# Patient Record
Sex: FEMALE
[De-identification: names, ages, dates, MRNs, and addresses within clinical notes are randomized per-mention and may not be internally consistent; named-entity substitution may affect disease eponyms.]

---

## 2019-07-20 ENCOUNTER — TRANSCRIPTION ENCOUNTER (OUTPATIENT)
Age: 49
End: 2019-07-20

## 2019-11-08 PROBLEM — Z00.00 ENCOUNTER FOR PREVENTIVE HEALTH EXAMINATION: Status: ACTIVE | Noted: 2019-11-08

## 2019-12-05 ENCOUNTER — APPOINTMENT (OUTPATIENT)
Dept: BREAST CENTER | Facility: CLINIC | Age: 49
End: 2019-12-05
Payer: SELF-PAY

## 2019-12-05 ENCOUNTER — LABORATORY RESULT (OUTPATIENT)
Age: 49
End: 2019-12-05

## 2019-12-05 VITALS
TEMPERATURE: 98.2 F | SYSTOLIC BLOOD PRESSURE: 117 MMHG | DIASTOLIC BLOOD PRESSURE: 72 MMHG | HEART RATE: 80 BPM | BODY MASS INDEX: 31.7 KG/M2 | WEIGHT: 214 LBS | HEIGHT: 69 IN

## 2019-12-05 DIAGNOSIS — N60.02 SOLITARY CYST OF RIGHT BREAST: ICD-10-CM

## 2019-12-05 DIAGNOSIS — N60.01 SOLITARY CYST OF RIGHT BREAST: ICD-10-CM

## 2019-12-05 DIAGNOSIS — Z78.9 OTHER SPECIFIED HEALTH STATUS: ICD-10-CM

## 2019-12-05 PROCEDURE — 99243 OFF/OP CNSLTJ NEW/EST LOW 30: CPT | Mod: 25

## 2019-12-05 PROCEDURE — 19000 PUNCTURE ASPIR CYST BREAST: CPT

## 2019-12-05 RX ORDER — CHLORHEXIDINE GLUCONATE 4 %
325 (65 FE) LIQUID (ML) TOPICAL
Refills: 0 | Status: ACTIVE | COMMUNITY

## 2019-12-05 RX ORDER — ERGOCALCIFEROL (VITAMIN D2) 1250 MCG
50000 CAPSULE ORAL
Refills: 0 | Status: ACTIVE | COMMUNITY

## 2019-12-05 RX ORDER — ACETAMINOPHEN 160 MG/5ML
500 SUSPENSION ORAL
Refills: 0 | Status: ACTIVE | COMMUNITY

## 2020-03-02 ENCOUNTER — APPOINTMENT (OUTPATIENT)
Dept: ULTRASOUND IMAGING | Facility: CLINIC | Age: 50
End: 2020-03-02
Payer: SELF-PAY

## 2020-03-02 PROCEDURE — 76641 ULTRASOUND BREAST COMPLETE: CPT | Mod: RT

## 2020-03-06 NOTE — PAST MEDICAL HISTORY
[Menarche Age ____] : age at menarche was [unfilled] [Definite ___ (Date)] : the last menstrual period was [unfilled] [Menstruating] : The patient is menstruating [Total Preg ___] : G[unfilled] [FreeTextEntry7] : yes, a few days [FreeTextEntry8] : NA

## 2020-03-06 NOTE — HISTORY OF PRESENT ILLNESS
[FreeTextEntry1] : Pt is a 48 y/o female, here for initial consult, referred by Dr. Yasmine Taveras Pac for tender right breast cyst for > 1 yr. Denies any breast lesions, discharge or masses to Left. Pain comes and goes but is uncomfortable to rest on R side. Does not require pain medication. Requesting drainage of cyst today.\par No FHx breast or other cancers.\par \par 7/25/19 Liliane Rodrigo DmmgT/US; baseline, dense, rodrigo benign calcs, multiple circumscribed masses rodrigo predominantly to R upper medial. BR2\par 7/25/19 Liliane Rodrigo US: multiple cysts rodrigo, R  largest cyst 12:00 N5 (4.5cm); L largest 12:00 (2.4cm). BR2

## 2020-03-06 NOTE — ADDENDUM
[FreeTextEntry1] : 12/5/19 FNA R 12:00 N5: Cytology. Neg for malignant cell. Compatible with inflamed cyst contents, FC changes.\par 3/2/20 Liliane R US: numerous cyst identified with largest one 11:00 (3cm), 12:00 (2cm) previously 4.5cm. BR2 Resume regular screening.

## 2020-03-06 NOTE — PROCEDURE
[___ ml Inj] : [unfilled] ~Uml [1%] : 1% [Alcohol] : using alcohol [FreeTextEntry1] : FNA right breast cyst [FreeTextEntry2] : Right breast cyst [FreeTextEntry3] : Procedure reviewed and consent signed. Area was prepped with alcohol. The area was injected with 3cc of 1% lidocaine. The Right breast cyst was lightly aspirated  using 18 gauge of 5cc of dark red serosangenous fluid. ? hemorrhagic cyst? pt denies trauma. Not fully resolved.  Patient tolerated the procedure well. Bandaid was applied. Specimen sent to cytology given bloody and not fully resolved. \par \par  [FreeTextEntry8] : Right breast 12:00 N5

## 2020-03-06 NOTE — REVIEW OF SYSTEMS
[Negative] : Psychiatric [Recent Weight Gain (___ Lbs)] : recent [unfilled] ~Ulb weight gain [Breast Lump] : breast lump [FreeTextEntry2] : reduction diet [de-identified] : Right breast 12:00

## 2020-03-06 NOTE — ASSESSMENT
[FreeTextEntry1] : Pt is a 50 y/o female, here for initial consult, referred by Dr. Yasmine Taveras Pac for tender right breast cyst for > 1 yr. Denies any breast lesions, discharge or masses to Left. Pain comes and goes but is uncomfortable to rest on R side. Does not require pain medication. Requesting drainage of cyst today.\par No FHx breast or other cancers.\par \par 7/25/19 Rodrigo DmmgT/US; baseline, dense, rodrigo benign calcs, multiple circumscribed masses rodrigo predominantly to R upper medial. BR2\par 7/25/19 Rodrigo US: multiple cysts rodrigo, R  largest cyst 12:00 N5 (4.5cm); L largest 12:00 (2.4cm). BR2\par CBE: FC changes with palp mass 12:00 R. FNA with 5 cc serosanguineous fluid. Recent mmg and US studies reviewed. \par The fluid was sent for cytology given not full resolution and fluid bloody.  If negative, would rec f.u u/s 3.\par Pt denied trauma to the area.

## 2020-03-06 NOTE — PHYSICAL EXAM
[Bra Size: ___] : Bra Size: [unfilled] [Atraumatic] : atraumatic [Normocephalic] : normocephalic [Supple] : supple [No Supraclavicular Adenopathy] : no supraclavicular adenopathy [No Thyromegaly] : no thyromegaly [No dominant masses] : no dominant masses in right breast  [Examined in the supine and seated position] : examined in the supine and seated position [No dominant masses] : no dominant masses left breast [No Nipple Retraction] : no left nipple retraction [No Nipple Discharge] : no left nipple discharge [No Axillary Lymphadenopathy] : no left axillary lymphadenopathy [No Rashes] : no rashes [No Edema] : no edema [No Ulceration] : no ulceration [de-identified] : FC changes [de-identified] : 12:00 N5 3cm palp mass corresponding to the largest cyst on u/s. tender.  [de-identified] : 2:00 12:00 2cm palp mass corresponding to cysts-confirmed on office u/s.

## 2020-03-06 NOTE — DATA REVIEWED
[FreeTextEntry1] : 7/25/19 Rodrigo DmmgT/US; baseline, dense, rodrigo benign calcs, multiple circumscribed masses rodrigo predominantly to R upper medial. BR2\par 7/25/19 Rodrigo US: multiple cysts rodrigo, R  largest cyst 12:00 N5 (4.5cm); L largest 12:00 (2.4cm). BR2

## 2020-04-02 ENCOUNTER — APPOINTMENT (OUTPATIENT)
Dept: BREAST CENTER | Facility: CLINIC | Age: 50
End: 2020-04-02

## 2021-02-10 ENCOUNTER — APPOINTMENT (OUTPATIENT)
Dept: ULTRASOUND IMAGING | Facility: CLINIC | Age: 51
End: 2021-02-10

## 2021-02-10 ENCOUNTER — APPOINTMENT (OUTPATIENT)
Dept: MAMMOGRAPHY | Facility: CLINIC | Age: 51
End: 2021-02-10

## 2021-03-01 ENCOUNTER — TRANSCRIPTION ENCOUNTER (OUTPATIENT)
Age: 51
End: 2021-03-01

## 2021-08-05 ENCOUNTER — APPOINTMENT (OUTPATIENT)
Dept: ULTRASOUND IMAGING | Facility: CLINIC | Age: 51
End: 2021-08-05

## 2021-08-05 ENCOUNTER — APPOINTMENT (OUTPATIENT)
Dept: MAMMOGRAPHY | Facility: CLINIC | Age: 51
End: 2021-08-05
Payer: COMMERCIAL

## 2021-08-05 PROCEDURE — 76536 US EXAM OF HEAD AND NECK: CPT

## 2021-08-05 PROCEDURE — 76641 ULTRASOUND BREAST COMPLETE: CPT | Mod: 50

## 2021-08-05 PROCEDURE — 77063 BREAST TOMOSYNTHESIS BI: CPT

## 2021-08-05 PROCEDURE — 77067 SCR MAMMO BI INCL CAD: CPT

## 2021-10-07 ENCOUNTER — APPOINTMENT (OUTPATIENT)
Dept: MAMMOGRAPHY | Facility: CLINIC | Age: 51
End: 2021-10-07
Payer: COMMERCIAL

## 2021-10-07 ENCOUNTER — APPOINTMENT (OUTPATIENT)
Dept: ULTRASOUND IMAGING | Facility: CLINIC | Age: 51
End: 2021-10-07

## 2021-10-07 PROCEDURE — 77065 DX MAMMO INCL CAD UNI: CPT | Mod: RT

## 2021-10-07 PROCEDURE — 76642 ULTRASOUND BREAST LIMITED: CPT | Mod: RT

## 2021-10-07 PROCEDURE — G0279: CPT | Mod: RT

## 2022-05-26 ENCOUNTER — APPOINTMENT (OUTPATIENT)
Dept: ULTRASOUND IMAGING | Facility: CLINIC | Age: 52
End: 2022-05-26
Payer: SELF-PAY

## 2022-05-26 ENCOUNTER — APPOINTMENT (OUTPATIENT)
Dept: MAMMOGRAPHY | Facility: CLINIC | Age: 52
End: 2022-05-26
Payer: COMMERCIAL

## 2022-05-26 PROCEDURE — 77065 DX MAMMO INCL CAD UNI: CPT | Mod: RT

## 2022-05-26 PROCEDURE — G0279: CPT | Mod: RT

## 2022-05-26 PROCEDURE — 76536 US EXAM OF HEAD AND NECK: CPT

## 2023-02-09 ENCOUNTER — APPOINTMENT (OUTPATIENT)
Dept: ULTRASOUND IMAGING | Facility: CLINIC | Age: 53
End: 2023-02-09
Payer: COMMERCIAL

## 2023-02-09 ENCOUNTER — APPOINTMENT (OUTPATIENT)
Dept: MAMMOGRAPHY | Facility: CLINIC | Age: 53
End: 2023-02-09
Payer: COMMERCIAL

## 2023-02-09 PROCEDURE — G0279: CPT

## 2023-02-09 PROCEDURE — 77066 DX MAMMO INCL CAD BI: CPT

## 2023-02-09 PROCEDURE — 76641 ULTRASOUND BREAST COMPLETE: CPT | Mod: 50

## 2024-02-29 ENCOUNTER — APPOINTMENT (OUTPATIENT)
Dept: ULTRASOUND IMAGING | Facility: CLINIC | Age: 54
End: 2024-02-29
Payer: COMMERCIAL

## 2024-02-29 ENCOUNTER — APPOINTMENT (OUTPATIENT)
Dept: MAMMOGRAPHY | Facility: CLINIC | Age: 54
End: 2024-02-29
Payer: COMMERCIAL

## 2024-02-29 PROCEDURE — 76641 ULTRASOUND BREAST COMPLETE: CPT | Mod: 50

## 2024-02-29 PROCEDURE — 77067 SCR MAMMO BI INCL CAD: CPT

## 2024-02-29 PROCEDURE — 77063 BREAST TOMOSYNTHESIS BI: CPT

## 2025-06-24 ENCOUNTER — APPOINTMENT (OUTPATIENT)
Dept: MAMMOGRAPHY | Facility: CLINIC | Age: 55
End: 2025-06-24
Payer: COMMERCIAL

## 2025-06-24 ENCOUNTER — APPOINTMENT (OUTPATIENT)
Dept: ULTRASOUND IMAGING | Facility: CLINIC | Age: 55
End: 2025-06-24
Payer: COMMERCIAL

## 2025-06-24 PROCEDURE — 77067 SCR MAMMO BI INCL CAD: CPT

## 2025-06-24 PROCEDURE — 76641 ULTRASOUND BREAST COMPLETE: CPT | Mod: 50

## 2025-06-24 PROCEDURE — 77063 BREAST TOMOSYNTHESIS BI: CPT

## 2025-08-23 ENCOUNTER — OFFICE (OUTPATIENT)
Dept: URBAN - METROPOLITAN AREA CLINIC 38 | Facility: CLINIC | Age: 55
Setting detail: OPHTHALMOLOGY
End: 2025-08-23

## 2025-08-23 DIAGNOSIS — H16.223: ICD-10-CM

## 2025-08-23 PROCEDURE — 99203 OFFICE O/P NEW LOW 30 MIN: CPT

## 2025-08-23 ASSESSMENT — KERATOMETRY
OS_AXISANGLE_DEGREES: 081
OD_AXISANGLE_DEGREES: 091
OD_K1POWER_DIOPTERS: 44.75
OS_K1POWER_DIOPTERS: 44.25
OD_K2POWER_DIOPTERS: 46.00
OS_K2POWER_DIOPTERS: 45.50

## 2025-08-23 ASSESSMENT — TONOMETRY
OD_IOP_MMHG: 13
OS_IOP_MMHG: 16

## 2025-08-23 ASSESSMENT — REFRACTION_AUTOREFRACTION
OS_SPHERE: +0.75
OD_AXIS: 175
OS_AXIS: 177
OS_CYLINDER: -1.00
OD_CYLINDER: -0.75
OD_SPHERE: +0.50

## 2025-08-23 ASSESSMENT — TEAR BREAK UP TIME (TBUT)
OS_TBUT: INSTANT
OD_TBUT: INSTANT

## 2025-08-23 ASSESSMENT — VISUAL ACUITY
OD_BCVA: 20/40-
OS_BCVA: 20/40

## 2025-08-23 ASSESSMENT — CONFRONTATIONAL VISUAL FIELD TEST (CVF)
OD_FINDINGS: FULL
OS_FINDINGS: FULL

## 2025-08-23 ASSESSMENT — SUPERFICIAL PUNCTATE KERATITIS (SPK)
OS_SPK: 1+ 2+
OD_SPK: 1+ 2+